# Patient Record
Sex: FEMALE | NOT HISPANIC OR LATINO | URBAN - METROPOLITAN AREA
[De-identification: names, ages, dates, MRNs, and addresses within clinical notes are randomized per-mention and may not be internally consistent; named-entity substitution may affect disease eponyms.]

---

## 2022-06-02 ENCOUNTER — MEDICAL CORRESPONDENCE (OUTPATIENT)
Dept: HEALTH INFORMATION MANAGEMENT | Facility: CLINIC | Age: 67
End: 2022-06-02

## 2022-06-06 ENCOUNTER — TRANSCRIBE ORDERS (OUTPATIENT)
Dept: OTHER | Age: 67
End: 2022-06-06
Payer: COMMERCIAL

## 2022-06-06 DIAGNOSIS — R27.0 ATAXIA: ICD-10-CM

## 2022-06-06 DIAGNOSIS — R29.2 HYPER REFLEXIA: Primary | ICD-10-CM

## 2022-06-09 NOTE — TELEPHONE ENCOUNTER
Action 6/9/22 MV 1.54pm   Action Taken Imaging request faxed to [facility]    6/17/22 MV 2.12pm  Images resolved in PACS         RECORDS RECEIVED FROM: external   REASON FOR VISIT: ataxia   Date of Appt: 7/8/22   NOTES (FOR ALL VISITS) STATUS DETAILS   OFFICE NOTE from referring provider Care Everywhere Dr Hiram Moreno @ [facility]:  6/2/22   OFFICE NOTE from other specialist Care Everywhere Dr Olivera @ [facility]:  5/9/22   MEDICATION LIST Care Everywhere    IMAGING  (FOR ALL VISITS)     MRI (HEAD, NECK, SPINE) Received [facility]:  MRI Brain 5/13/22  MRI Thoracic Spine 4/19/22  MRI Thoracic Spine 4/7/22  MRI Cervical Spine 4/7/22  MRI Lumbar Spine 3/29/22

## 2022-06-12 ENCOUNTER — HEALTH MAINTENANCE LETTER (OUTPATIENT)
Age: 67
End: 2022-06-12

## 2022-07-08 ENCOUNTER — OFFICE VISIT (OUTPATIENT)
Dept: NEUROLOGY | Facility: CLINIC | Age: 67
End: 2022-07-08
Payer: COMMERCIAL

## 2022-07-08 ENCOUNTER — VIRTUAL VISIT (OUTPATIENT)
Dept: NEUROLOGY | Facility: CLINIC | Age: 67
End: 2022-07-08
Payer: COMMERCIAL

## 2022-07-08 ENCOUNTER — PRE VISIT (OUTPATIENT)
Dept: NEUROLOGY | Facility: CLINIC | Age: 67
End: 2022-07-08

## 2022-07-08 VITALS
DIASTOLIC BLOOD PRESSURE: 76 MMHG | WEIGHT: 133.7 LBS | OXYGEN SATURATION: 97 % | TEMPERATURE: 98.2 F | HEART RATE: 80 BPM | SYSTOLIC BLOOD PRESSURE: 114 MMHG

## 2022-07-08 DIAGNOSIS — R27.0 ATAXIA: Primary | ICD-10-CM

## 2022-07-08 DIAGNOSIS — R25.2 SPASTICITY: Primary | ICD-10-CM

## 2022-07-08 PROCEDURE — 99204 OFFICE O/P NEW MOD 45 MIN: CPT | Performed by: PSYCHIATRY & NEUROLOGY

## 2022-07-08 PROCEDURE — 99207 PR NO BILLABLE SERVICE THIS VISIT: CPT | Performed by: GENETIC COUNSELOR, MS

## 2022-07-08 RX ORDER — ZOLPIDEM TARTRATE 5 MG/1
5 TABLET ORAL PRN
COMMUNITY
Start: 2022-01-24

## 2022-07-08 RX ORDER — NIACINAMIDE 500 MG
1000 TABLET ORAL DAILY
COMMUNITY

## 2022-07-08 RX ORDER — ASPIRIN 81 MG/81MG
81 CAPSULE ORAL DAILY
COMMUNITY
Start: 2022-05-24

## 2022-07-08 RX ORDER — FEXOFENADINE HCL 180 MG/1
180 TABLET ORAL DAILY
COMMUNITY

## 2022-07-08 NOTE — PROGRESS NOTES
Department of Neurology  Movement Disorders Division   Ataxia Clinic  New Patient Visit     Patient: Nancy Vizcaino   MRN: [id number]   : 1955   Date of Visit: 2022  PCP: Jovana Martinez   Referring provider: Mookie Moreno MD  Ms. Vizcaino is a 67-year-old woman who was referred to the movement disorders clinic for gait imbalance.  She had multiple evaluation therapy in the past.  She was found to have lumbar sacral stenosis with spondylolisthesis in the lumbar sacral area.  It is unclear if this is unstable and if fusion is needed.  She underwent spinal strengthening exercises.  She was also found to have a lesion at T9 primarily on the left side that was an enhancing mass mass that was thought to be a meningioma or nerve sheath tumor.  She does report some urinary urgency and urge incontinence.  Another issue with Ms. Vizcaino is that her sister was seen in the clinic here about 20 years ago and was apparently suspected for ataxia and was eventually diagnosed with multiple sclerosis.    No past medical history on file.  Current Outpatient Medications   Medication     Aspirin (VAZALORE) 81 MG CAPS     fexofenadine (ALLEGRA) 180 MG tablet     niacinamide 500 MG tablet     zolpidem (AMBIEN) 5 MG tablet     No current facility-administered medications for this visit.     Physical examination Ms. Vizcaino was cognitively intact pleasant woman her speech was normal cranial nerves were unremarkable no nystagmus.  Upper and lower extremity examination showed normal tone in the upper extremities reflexes were normal in the upper extremities and hyper in the lower extremities.  Does have definite hyper Claudia/spasticity in the lower extremities slightly more on the left side than the right.  The strength in the lower extremities was normal including dorsiflexion.  She could walk on heels and toes a little or no difficulty no asymmetry in dorsi flexion or walking on heels.  Reflexes were brisk in the lower  extremities at 3+.  The plantars were equivocal however.  Sensory examination in the lower extremities was normal with a vibratory sensation more than 8 seconds at the big toes.  No distal shading of pinprick sensation.  Abdominal reflexes were absent in 4 quarters.  LABS:  6/7/22:  - Vitamin E alpha 10.4, Gamma 0.7  - TTG antibodies negative    5/18/22:  - paraneoplastic panel negative  - B12 881 (elevated)  - homocysteine 10.1  - folate 14.2  - copper 131.2  - JASKARAN antibody <5.0    IMAGING/STUDIES:  MR brain with/without contrast 5/13/2022:   IMPRESSION:     1. No evidence for acute infarct.   2. No abnormal intra-axial signal or enhancement.   3. Degenerative disc disease at the C4-C5 level of the cervical spine.     MR thoracic spine with/without 4/19/2022:  IMPRESSION:   Previously noted mildly lobulated, extramedullary, intradural focus of T2 hypointensity at the level of T9 demonstrates associated enhancement, series 16, image 17. This finding is indeterminate, but could represent a nerve sheath tumor or meningioma. There is contact with the left lateral spinal cord without appreciable mass effect. Interval follow-up examination is recommended.    MR cervical spine without contrast 4/7/2022:  1. Moderate spinal canal stenosis at C4-5 and C5-6.  2. Moderate to severe bilateral neural foraminal stenosis at C4-5.  3. Additional degenerative changes as above.    MR lumbar spine without contrast for 3/29/2022  IMPRESSION: Compared with 01/10/2019,  1. Progression of degenerative changes at L3-4. There is now severe canal stenosis. Similar moderate to severe bilateral foraminal stenosis.  2. Remainder of additional degenerative changes are overall similar to prior. Severe canal stenosis L4-5. Moderate bilateral foraminal stenosis L5-S1.    EMG/NCS 5/18/2022:  Electrodiagnostic Conclusion:  This is a mildly abnormal study. Pertinent findings:  1. There is no electrodiagnostic evidence to indicate any acute or  subacute lumbosacral radiculopathy affecting either lower extremity.  2. There is no electrodiagnostic evidence to indicate a large fiber polyneuropathy.  3. There is electrodiagnostic evidence to suggest a very mild chronic left L4 radiculopathy without suggestion of recent denervation.    Assessment spasticity of the lower extremities perhaps slightly asymmetric.  Given the lesion in the thoracic spine at the level cord compression does need to be ruled out.  Ms. Vizcaino will be referred to neurosurgery.  She would also like to have the lumbar sacral region looked at with flexion-extension imaging.  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

## 2022-07-08 NOTE — LETTER
7/8/2022       RE: Nancy Vizcaino  [address] [address]     Dear Colleague,    Thank you for referring your patient, Nancy Vizcaino, to the [facility]. Please see a copy of my visit note below.    GENETIC COUNSELING-Ataxia clinic  I met with Nancy initially in the ataxia clinic, but had to schedule a phone consult for later today because I had other patients scheduled and she was not on my schedule for the day. We had a 20 minute phone conversation to review family history and discuss genetic testing options.    MEDICAL HISTORY-  Nancy reports a history of spasticity/stiffness. She was referred by Dr. Mookie Moreno for evaluation. She believes symptoms began 5 years ago and have worsened over the past year.  Her symptoms may be due to a spinal lesion, but given the history in her sister, the possibility of a genetic etiology is considered.    FAMILY HISTORY-see scanned pedigree  1. Nancy's sister was diagnosed with MS many years ago in our clinic. She was reportedly seen in our ataxia clinic, but I cannot view records in our current system. She was seen by our MS specialist, Dr. Abraham, and she was noted to have multiple lesions on MRI consistent with this diagnosis. Nancy spoke with her sister and they were not certain whether spinal tap showed characteristic findings.  2. Nancy reported that her parents are second cousins (her paternal great grandfathers on both sides were brothers)- she did not specify which of her grandparents are the connecting grandparents.    GENETIC COUNSELING-  We discussed the genetic and environmental basis of neurologic disease. I explained that in most cases, it results form complex and lifelong interaction of multiple genetic and environmental factors. In some cases, there may be a single gene cause.  I explained that her symptoms, in the context of her sister's diagnosis,  raises some suspicion for genetic etiology for her symptoms- specifically Hereditary spastic paraplegia (HSP).    I explained that HSP is an extremely genetically heterogeneous condition that can be inherited in many different patterns. The history of consanguinity in her parents along with the history in her sister and the lack of affected relatives in other generations would suggest the possibility of a recessive mode of inheritance.    I explained that genetic testing evaluates several dozen genes associated with HSP. If pathogenic mutation(s) are identified, this would provide a precise diagnosis and would directly impact the risk/benefit discussion of doing spinal surgery to alleviate her symptoms. A positive result would also allow us to provide risk assessments and/or targeted testing to family members. I did explain that the testing also might not idenitfy a precise diagnosis.    I did spend some time discussing her sister's diagnosis.  I explained that in some cases, an incorrect name may be put on a condition- although I am certainly not calling into doubt her sister's diagnosis. If we do identify a genetic etiology in Nancy, it might be worthwhile to consider testing in her sister to see if she has the same diagnosis.    Nancy provided consent for genetic testing for HSP. Testing will be done through [employer]'s patient pay testing program.      Sumit Shin MS, [facility]  Certified Genetic Counselor

## 2022-07-08 NOTE — LETTER
2022     RE: Nancy Vizcaino  [address] [address]     Dear Colleague,    Thank you for referring your patient, Nancy Vizcaino, to the [facility]UR[facility]IC [place] [facility]. Please see a copy of my visit note below.    Department of Neurology  Movement Disorders Division   Ataxia Clinic  New Patient Visit     Patient: Nancy Vizcaino   MRN: [id number]   : 1955   Date of Visit: 2022  PCP: Jovana Martinez   Referring provider: Mookie Moreno MD  Ms. Vizcaino is a 67-year-old woman who was referred to the movement disorders clinic for gait imbalance.  She had multiple evaluation therapy in the past.  She was found to have lumbar sacral stenosis with spondylolisthesis in the lumbar sacral area.  It is unclear if this is unstable and if fusion is needed.  She underwent spinal strengthening exercises.  She was also found to have a lesion at T9 primarily on the left side that was an enhancing mass mass that was thought to be a meningioma or nerve sheath tumor.  She does report some urinary urgency and urge incontinence.  Another issue with Ms. Vizcaino is that her sister was seen in the clinic here about 20 years ago and was apparently suspected for ataxia and was eventually diagnosed with multiple sclerosis.    No past medical history on file.  Current Outpatient Medications   Medication     Aspirin (VAZALORE) 81 MG CAPS     fexofenadine (ALLEGRA) 180 MG tablet     niacinamide 500 MG tablet     zolpidem (AMBIEN) 5 MG tablet     No current facility-administered medications for this visit.     Physical examination Ms. Vizcaino was cognitively intact pleasant woman her speech was normal cranial nerves were unremarkable no nystagmus.  Upper and lower extremity examination showed normal tone in the upper extremities reflexes were normal in the upper extremities and hyper in the lower extremities.  Does have definite hyper  Claudia/spasticity in the lower extremities slightly more on the left side than the right.  The strength in the lower extremities was normal including dorsiflexion.  She could walk on heels and toes a little or no difficulty no asymmetry in dorsi flexion or walking on heels.  Reflexes were brisk in the lower extremities at 3+.  The plantars were equivocal however.  Sensory examination in the lower extremities was normal with a vibratory sensation more than 8 seconds at the big toes.  No distal shading of pinprick sensation.  Abdominal reflexes were absent in 4 quarters.  LABS:  6/7/22:  - Vitamin E alpha 10.4, Gamma 0.7  - TTG antibodies negative    5/18/22:  - paraneoplastic panel negative  - B12 881 (elevated)  - homocysteine 10.1  - folate 14.2  - copper 131.2  - JASKARAN antibody <5.0    IMAGING/STUDIES:  MR brain with/without contrast 5/13/2022:   IMPRESSION:     1. No evidence for acute infarct.   2. No abnormal intra-axial signal or enhancement.   3. Degenerative disc disease at the C4-C5 level of the cervical spine.     MR thoracic spine with/without 4/19/2022:  IMPRESSION:   Previously noted mildly lobulated, extramedullary, intradural focus of T2 hypointensity at the level of T9 demonstrates associated enhancement, series 16, image 17. This finding is indeterminate, but could represent a nerve sheath tumor or meningioma. There is contact with the left lateral spinal cord without appreciable mass effect. Interval follow-up examination is recommended.    MR cervical spine without contrast 4/7/2022:  1. Moderate spinal canal stenosis at C4-5 and C5-6.  2. Moderate to severe bilateral neural foraminal stenosis at C4-5.  3. Additional degenerative changes as above.    MR lumbar spine without contrast for 3/29/2022  IMPRESSION: Compared with 01/10/2019,  1. Progression of degenerative changes at L3-4. There is now severe canal stenosis. Similar moderate to severe bilateral foraminal stenosis.  2. Remainder of  additional degenerative changes are overall similar to prior. Severe canal stenosis L4-5. Moderate bilateral foraminal stenosis L5-S1.    EMG/NCS 5/18/2022:  Electrodiagnostic Conclusion:  This is a mildly abnormal study. Pertinent findings:  1. There is no electrodiagnostic evidence to indicate any acute or subacute lumbosacral radiculopathy affecting either lower extremity.  2. There is no electrodiagnostic evidence to indicate a large fiber polyneuropathy.  3. There is electrodiagnostic evidence to suggest a very mild chronic left L4 radiculopathy without suggestion of recent denervation.    Assessment spasticity of the lower extremities perhaps slightly asymmetric.  Given the lesion in the thoracic spine at the level cord compression does need to be ruled out.  Ms. Vizcaino will be referred to neurosurgery.  She would also like to have the lumbar sacral region looked at with flexion-extension imaging.  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    Again, thank you for allowing me to participate in the care of your patient.      Sincerely,    Razia Alvarado MD

## 2022-07-08 NOTE — PROGRESS NOTES
Department of Neurology  Movement Disorders Division   Ataxia Clinic  New Patient Visit    Patient: Nancy Vizcaino   MRN: [id number]   : 1955   Date of Visit: 2022  PCP: Jovana Martinez   Referring provider: Mookie Moreno MD    CC:  Gait instability    HPI:  Ms. Vizcaino is a 67 year old female with history significant for lumbar and cervical spinal stenosis as well as possible thoracic meningioma versus nerve sheath tumor who presents to ataxia clinic for gait instability. She is accompanied by ***.    She reports ***    MEDICATIONS reviewed & pertinent for:  ***    ROS:  All others negative except as listed above.    No past medical history on file.     No past surgical history on file.     No current outpatient medications on file.     Not on File     No family history on file.     Social History:  She       PHYSICAL EXAM:  There were no vitals taken for this visit. ***    Gen: alert, active, attentive, appropriately groomed     NEURO:***  MS: Alert and oriented to person, place, time, and situation.  Speech normal to comprehension and naming.  Recent and remote memory intact.  Attention and concentration normal.  Fund of knowledge normal.    CN:  Pupils equal, round, and reactive to light. VFF. EOM normal range, no nystagmus.  Normal saccades. Facial sensation intact. Face symmetric at rest and with activation. Intact to finger rub bilaterally. Palate rise b/l, uvula midline.  No dysarthria. Trapezius and SCM 5/5 bilaterally. Tongue protrudes midline. No fasciculation or atrophy noted.    Motor:  Normal bulk and tone throughout upper and lower extremities.  No abnormal movements or fasciculations observed. Strength is 5/5 throughout and symmetric. ***    Sensation:  Intact to LT, vibration, and temperature in all extremities.    Coordination:  FTN and HTN intact bilaterally.    Gait:  Normal gait. Tandem gait intact.  Able to walk on toes and heels.  Romberg negative.    LABS:  22:  - Vitamin E  alpha 10.4, Gamma 0.7  - TTG antibodies negative    5/18/22:  - paraneoplastic panel negative  - B12 881 (elevated)  - homocysteine 10.1  - folate 14.2  - copper 131.2  - JASKARAN antibody <5.0    IMAGING/STUDIES:  MR brain with/without contrast 5/13/2022:   IMPRESSION:     1. No evidence for acute infarct.   2. No abnormal intra-axial signal or enhancement.   3. Degenerative disc disease at the C4-C5 level of the cervical spine.     MR thoracic spine with/without 4/19/2022:  IMPRESSION:   Previously noted mildly lobulated, extramedullary, intradural focus of T2 hypointensity at the level of T9 demonstrates associated enhancement, series 16, image 17. This finding is indeterminate, but could represent a nerve sheath tumor or meningioma. There is contact with the left lateral spinal cord without appreciable mass effect. Interval follow-up examination is recommended.    MR cervical spine without contrast 4/7/2022:  1. Moderate spinal canal stenosis at C4-5 and C5-6.  2. Moderate to severe bilateral neural foraminal stenosis at C4-5.  3. Additional degenerative changes as above.    MR lumbar spine without contrast for 3/29/2022  IMPRESSION: Compared with 01/10/2019,  1. Progression of degenerative changes at L3-4. There is now severe canal stenosis. Similar moderate to severe bilateral foraminal stenosis.  2. Remainder of additional degenerative changes are overall similar to prior. Severe canal stenosis L4-5. Moderate bilateral foraminal stenosis L5-S1.    EMG/NCS 5/18/2022:  Electrodiagnostic Conclusion:  This is a mildly abnormal study. Pertinent findings:  1. There is no electrodiagnostic evidence to indicate any acute or subacute lumbosacral radiculopathy affecting either lower extremity.  2. There is no electrodiagnostic evidence to indicate a large fiber polyneuropathy.  3. There is electrodiagnostic evidence to suggest a very mild chronic left L4 radiculopathy without suggestion of recent  denervation.    ASSESSMENT:  ***    PLAN:  ***    Patient and plan was examined & discussed with attending physician, Dr. Alvarado.     Emily Wellington MD  Movement Disorders Fellow

## 2022-07-08 NOTE — PROGRESS NOTES
Nancy is a 67 year old who is being evaluated via a billable telephone visit.      What phone number would you like to be contacted at? cell  How would you like to obtain your AVS? CANDY    Phone call duration: 20 minutes      GENETIC COUNSELING-Ataxia clinic  I met with Nancy initially in the ataxia clinic, but had to schedule a phone consult for later today because I had other patients scheduled and she was not on my schedule for the day. We had a 20 minute phone conversation to review family history and discuss genetic testing options.    MEDICAL HISTORY-  Nancy reports a history of spasticity/stiffness. She was referred by Dr. Mookie Moreno for evaluation. She believes symptoms began 5 years ago and have worsened over the past year.  Her symptoms may be due to a spinal lesion, but given the history in her sister, the possibility of a genetic etiology is considered.    FAMILY HISTORY-see scanned pedigree  1. Nancy's sister was diagnosed with MS many years ago in our clinic. She was reportedly seen in our ataxia clinic, but I cannot view records in our current system. She was seen by our MS specialist, Dr. Abraham, and she was noted to have multiple lesions on MRI consistent with this diagnosis. Nancy spoke with her sister and they were not certain whether spinal tap showed characteristic findings.  2. Nancy reported that her parents are second cousins (her paternal great grandfathers on both sides were brothers)- she did not specify which of her grandparents are the connecting grandparents.    GENETIC COUNSELING-  We discussed the genetic and environmental basis of neurologic disease. I explained that in most cases, it results form complex and lifelong interaction of multiple genetic and environmental factors. In some cases, there may be a single gene cause.  I explained that her symptoms, in the context of her sister's diagnosis, raises some suspicion for genetic etiology for her symptoms- specifically  Hereditary spastic paraplegia (HSP).    I explained that HSP is an extremely genetically heterogeneous condition that can be inherited in many different patterns. The history of consanguinity in her parents along with the history in her sister and the lack of affected relatives in other generations would suggest the possibility of a recessive mode of inheritance.    I explained that genetic testing evaluates several dozen genes associated with HSP. If pathogenic mutation(s) are identified, this would provide a precise diagnosis and would directly impact the risk/benefit discussion of doing spinal surgery to alleviate her symptoms. A positive result would also allow us to provide risk assessments and/or targeted testing to family members. I did explain that the testing also might not idenitfy a precise diagnosis.    I did spend some time discussing her sister's diagnosis.  I explained that in some cases, an incorrect name may be put on a condition- although I am certainly not calling into doubt her sister's diagnosis. If we do identify a genetic etiology in Nancy, it might be worthwhile to consider testing in her sister to see if she has the same diagnosis.    Nancy provided consent for genetic testing for HSP. Testing will be done through [employer]'s patient pay testing program.    Sumit Shin MS, [facility]  Certified Genetic Counselor

## 2022-07-08 NOTE — NURSING NOTE
Pt is here for an ataxia evaluation.  Pt denies headaches. She does not have double or blurred vision. She does not choke when she eats or drink.She states she falls about 2 times per month. She is albino to get her self up. She only has problems sleeping when she is worried, sometimes she takes Ambien. She does snore when she sleeps. She is not tired during the day. She has been going to physical therapy twice per week.

## 2022-07-18 ENCOUNTER — TELEPHONE (OUTPATIENT)
Dept: NEUROSURGERY | Facility: CLINIC | Age: 67
End: 2022-07-18

## 2022-07-19 ENCOUNTER — TELEPHONE (OUTPATIENT)
Dept: NEUROSURGERY | Facility: CLINIC | Age: 67
End: 2022-07-19

## 2022-07-19 DIAGNOSIS — M48.061 SPINAL STENOSIS, LUMBAR REGION, WITHOUT NEUROGENIC CLAUDICATION: Primary | ICD-10-CM

## 2022-07-19 DIAGNOSIS — M54.16 LUMBAR RADICULOPATHY: ICD-10-CM

## 2022-07-19 NOTE — TELEPHONE ENCOUNTER
[facility] Call Center    Phone Message    May a detailed message be left on voicemail: yes     Reason for Call: Appointment Intake    Referring Provider Name: Razia Alvarado MD  Diagnosis and/or Symptoms: Ataxia     Please call Pt back at [phone] to schedule    Action Taken: Message routed to:  [facility] Neurosurgery    Travel Screening: Not Applicable

## 2022-07-20 NOTE — TELEPHONE ENCOUNTER
SPINE PATIENTS - NEW PROTOCOL PREVISIT    RECORDS RECEIVED FROM: internal   REASON FOR VISIT: Spinal chord tumor   Date of Appt: 8/23/22   NOTES (FOR ALL VISITS) STATUS DETAILS   OFFICE NOTE from referring provider Internal Dr Alvarado @ [facility]:  7/8/22   EMG REPORT Care Everywhere [facility]:  5/18/22   MEDICATION LIST Internal    IMAGING  (FOR ALL VISITS)     MRI (HEAD, NECK, SPINE) Received [facility]:  MRI Thoracic Spine 4/19/22  MRI Thoracic Spine 4/7/22  MRI Cervical Spine 4/7/22  MRI Lumbar Spine 3/29/22

## 2022-07-29 ENCOUNTER — TELEPHONE (OUTPATIENT)
Dept: NEUROLOGY | Facility: CLINIC | Age: 67
End: 2022-07-29

## 2022-07-29 NOTE — TELEPHONE ENCOUNTER
Called pt, she asked to have her records faxed to the [facility], fax [phone].  She has an appointment on Aug 19 to see Dr. Alvarado and Sumit Shin, genetics counselor.  Clinic notes sent

## 2022-08-05 ENCOUNTER — TELEPHONE (OUTPATIENT)
Dept: NEUROSURGERY | Facility: CLINIC | Age: 67
End: 2022-08-05

## 2022-08-18 ENCOUNTER — TELEPHONE (OUTPATIENT)
Dept: NEUROSURGERY | Facility: CLINIC | Age: 67
End: 2022-08-18

## 2022-08-18 NOTE — TELEPHONE ENCOUNTER
[facility]    Phone Message    May a detailed message be left on voicemail: yes     Reason for Call: Other: Pt called and stated that she was told that she needed to reschedule her appt on 08/23/22. Per notes appt was to be switched to the afternoon. Writer was unable to schedule for 08/23 as there were no later appts available. Pt did request the appt on 08/30 as she wanted to make sure that she had an appt scheduled in the meantime if something were to come up. Please call Pt back to discuss.      Action Taken: Message routed to:  [facility]    Travel Screening: Not Applicable

## 2022-08-19 ENCOUNTER — OFFICE VISIT (OUTPATIENT)
Dept: NEUROLOGY | Facility: CLINIC | Age: 67
End: 2022-08-19
Payer: COMMERCIAL

## 2022-08-19 VITALS
HEART RATE: 71 BPM | DIASTOLIC BLOOD PRESSURE: 83 MMHG | SYSTOLIC BLOOD PRESSURE: 122 MMHG | WEIGHT: 135.6 LBS | OXYGEN SATURATION: 96 %

## 2022-08-19 DIAGNOSIS — Z15.89: Primary | ICD-10-CM

## 2022-08-19 DIAGNOSIS — G11.4 HEREDITARY SPASTIC PARAPLEGIA (H): Primary | ICD-10-CM

## 2022-08-19 PROCEDURE — 99207 PR STATISTIC GENETIC COUNSELING, <16 MIN: CPT | Performed by: GENETIC COUNSELOR, MS

## 2022-08-19 PROCEDURE — 99213 OFFICE O/P EST LOW 20 MIN: CPT | Mod: 25 | Performed by: PSYCHIATRY & NEUROLOGY

## 2022-08-19 RX ORDER — BACLOFEN 10 MG/1
5 TABLET ORAL 3 TIMES DAILY
Qty: 90 TABLET | Refills: 3 | Status: SHIPPED | OUTPATIENT
Start: 2022-08-19

## 2022-08-19 ASSESSMENT — PAIN SCALES - GENERAL: PAINLEVEL: NO PAIN (0)

## 2022-08-19 NOTE — PROGRESS NOTES
GENETIC COUNSELING-Ataxia clinic  I met with Nancy Vizcaino and her , Andrew, for a 25 minute genetic consult in the ataxia clinic. I had met with her previously to discuss genetic testing and to review family history.  Since that visit, we received results of genetic testing that confirmed a diagnosis of hereditary spastic paraplegia type 7 (SPG7).  She is homozygous for a pathogenic [id number] variant in SPG7- which is consistent with the reported consanguinity in her parents.    We updated family history today with the following information:  1. Nancy shared her diagnosis with her sister. Her sister has been diagnosed with multiple sclerosis and we had been considering the possibility of whether she might also have this genetic condition. It is certainly possible that she actually has SPG7- but from her conversation with her sister, it did not sound like she wanted to pursue any testing at this time. Nancy has made her sister's children aware of the genetic diagnosis in the family.  2. Nancy clarified the exact relationship between her parents. Her mother's father's father was brothers with her father's father's father.    We discussed the autosomal recessive inheritance of SPG7.  Nancy's result confirmed that she has inherited the same pathogenic variant from each of her parents. Her siblings could theoretically be affected, carriers, or unaffected non-carriers. She has shared information about her diagnosis with her siblings and their children.    We discussed how mutations in SPG7 can cause a spectrum of neurologic findings that can include both ataxia and spasticity. Some individuals may only manifest symptoms of spasticity, while others have both ataxia and spasticity. I explained that this is a progressive condition and we would expect her symptoms to grow worse over time- although it is hard to make precise predictions about the future in any given patient. We discussed the importance of  "remaining healthy and physically active. We discussed support resources including the National Ataxia [employer]. I did explain that even though this condition is formally considered \"hereditary spastic paraplegia\" SPG7 is actually a quite common diagnosis in our ataxia clinic and therefore there are many patients diagnosed with SPG7 who are active in the NAF.    All questions were answered.    Sumit Shin MS, [facility]  Certified Genetic Counselor  "

## 2022-08-19 NOTE — LETTER
8/19/2022       RE: Nancy Vizcaino  [address]  [place] [address]     Dear Colleague,    Thank you for referring your patient, Nancy Vizcaino, to the [facility] [place] [facility]. Please see a copy of my visit note below.    I have personally interviewed and examined Ms. Nancy Vizcaino and agree with diagnosis and management. The total time spent with the patient was 30 minutes, over 50% of the time spent in counseling and coordinating care.    Ms. Vizcaino returns for follow-up today she is 67-year-old woman with spastic paraparesis.  She does have a lesion on the left side at T9 with cord compression however these findings are bilateral and she was checked for hereditary spastic paraplegia and turned out to have SPG 7.    No past medical history on file.  Current Outpatient Medications   Medication     baclofen (LIORESAL) 10 MG tablet     Aspirin (VAZALORE) 81 MG CAPS     fexofenadine (ALLEGRA) 180 MG tablet     niacinamide 500 MG tablet     zolpidem (AMBIEN) 5 MG tablet     No current facility-administered medications for this visit.     Physical examination: This was unchanged she does have hyperreflexia of the lower extremities left more than right gait was slightly wide-based and spastic.    Assessment hereditary spastic paraplegia type VII.  We discussed using baclofen as well as medical marijuana she is agreeable to try both.  She will meet with neurosurgery next week and urology a few weeks later.    Razia Alvarado MD

## 2022-08-19 NOTE — LETTER
8/19/2022       RE: Nancy Vizcaino  [address]  [place] [address]     Dear Colleague,    Thank you for referring your patient, Nancy Vizcaino, to the [facility]. Please see a copy of my visit note below.    GENETIC COUNSELING-Ataxia clinic  I met with Nancy Vizcaino and her , Andrew, for a 25 minute genetic consult in the ataxia clinic. I had met with her previously to discuss genetic testing and to review family history.  Since that visit, we received results of genetic testing that confirmed a diagnosis of hereditary spastic paraplegia type 7 (SPG7).  She is homozygous for a pathogenic [id number] variant in SPG7- which is consistent with the reported consanguinity in her parents.    We updated family history today with the following information:  1. Nancy shared her diagnosis with her sister. Her sister has been diagnosed with multiple sclerosis and we had been considering the possibility of whether she might also have this genetic condition. It is certainly possible that she actually has SPG7- but from her conversation with her sister, it did not sound like she wanted to pursue any testing at this time. Nancy has made her sister's children aware of the genetic diagnosis in the family.  2. Nancy clarified the exact relationship between her parents. Her mother's father's father was brothers with her father's father's father.    We discussed the autosomal recessive inheritance of SPG7.  Nancy's result confirmed that she has inherited the same pathogenic variant from each of her parents. Her siblings could theoretically be affected, carriers, or unaffected non-carriers. She has shared information about her diagnosis with her siblings and their children.    We discussed how mutations in SPG7 can cause a spectrum of neurologic findings that can include both ataxia and spasticity. Some individuals may only  "manifest symptoms of spasticity, while others have both ataxia and spasticity. I explained that this is a progressive condition and we would expect her symptoms to grow worse over time- although it is hard to make precise predictions about the future in any given patient. We discussed the importance of remaining healthy and physically active. We discussed support resources including the National Ataxia Foundation. I did explain that even though this condition is formally considered \"hereditary spastic paraplegia\" SPG7 is actually a quite common diagnosis in our ataxia clinic and therefore there are many patients diagnosed with SPG7 who are active in the NAF.    All questions were answered.    Sumit Shin MS, [facility]  Certified Genetic Counselor  "

## 2022-08-19 NOTE — PROGRESS NOTES
Ms. Vizcaino returns for follow-up today she is 67-year-old woman with spastic paraparesis.  She does have a lesion on the left side at T9 with cord compression however these findings are bilateral and she was checked for hereditary spastic paraplegia and turned out to have SPG 7.    No past medical history on file.  Current Outpatient Medications   Medication     baclofen (LIORESAL) 10 MG tablet     Aspirin (VAZALORE) 81 MG CAPS     fexofenadine (ALLEGRA) 180 MG tablet     niacinamide 500 MG tablet     zolpidem (AMBIEN) 5 MG tablet     No current facility-administered medications for this visit.     Physical examination: This was unchanged she does have hyperreflexia of the lower extremities left more than right gait was slightly wide-based and spastic.    Assessment hereditary spastic paraplegia type VII.  We discussed using baclofen as well as medical marijuana she is agreeable to try both.  She will meet with neurosurgery next week and urology a few weeks later.

## 2022-08-19 NOTE — PROGRESS NOTES
I have personally interviewed and examined Ms. Nancy Vizcaino and agree with diagnosis and management. The total time spent with the patient was 30 minutes, over 50% of the time spent in counseling and coordinating care.

## 2022-08-23 ENCOUNTER — PRE VISIT (OUTPATIENT)
Dept: NEUROSURGERY | Facility: CLINIC | Age: 67
End: 2022-08-23

## 2022-08-30 ENCOUNTER — ANCILLARY PROCEDURE (OUTPATIENT)
Dept: GENERAL RADIOLOGY | Facility: CLINIC | Age: 67
End: 2022-08-30
Attending: NEUROLOGICAL SURGERY
Payer: COMMERCIAL

## 2022-08-30 ENCOUNTER — OFFICE VISIT (OUTPATIENT)
Dept: NEUROSURGERY | Facility: CLINIC | Age: 67
End: 2022-08-30
Attending: PSYCHIATRY & NEUROLOGY
Payer: COMMERCIAL

## 2022-08-30 VITALS — HEART RATE: 71 BPM | DIASTOLIC BLOOD PRESSURE: 73 MMHG | SYSTOLIC BLOOD PRESSURE: 108 MMHG | OXYGEN SATURATION: 97 %

## 2022-08-30 DIAGNOSIS — R27.0 ATAXIA: ICD-10-CM

## 2022-08-30 DIAGNOSIS — M48.061 SPINAL STENOSIS, LUMBAR REGION, WITHOUT NEUROGENIC CLAUDICATION: ICD-10-CM

## 2022-08-30 DIAGNOSIS — M54.16 LUMBAR RADICULOPATHY: ICD-10-CM

## 2022-08-30 PROCEDURE — 72082 X-RAY EXAM ENTIRE SPI 2/3 VW: CPT | Performed by: STUDENT IN AN ORGANIZED HEALTH CARE EDUCATION/TRAINING PROGRAM

## 2022-08-30 PROCEDURE — 77073 BONE LENGTH STUDIES: CPT | Performed by: STUDENT IN AN ORGANIZED HEALTH CARE EDUCATION/TRAINING PROGRAM

## 2022-08-30 PROCEDURE — 99204 OFFICE O/P NEW MOD 45 MIN: CPT | Mod: GC | Performed by: NEUROLOGICAL SURGERY

## 2022-08-30 RX ORDER — FLUTICASONE PROPIONATE 50 MCG
2 SPRAY, SUSPENSION (ML) NASAL PRN
COMMUNITY

## 2022-08-30 ASSESSMENT — PAIN SCALES - GENERAL: PAINLEVEL: MILD PAIN (2)

## 2022-08-30 NOTE — PATIENT INSTRUCTIONS
Dr Peterson talked about the possibility of surgery and you have opted to wait  and observe for the time being.  Please return to clinic for f/u in 3 months with updated imaging.

## 2022-08-30 NOTE — PROGRESS NOTES
[facility]       H&P NOTE    HPI:  Ms. Vizcaino is a 67-year-old woman with gait imbalance since 5 years, worse in the last one year. She was recently diagnosed with hereditary spastic paraplegia type VII and is on baclofen. She also has low back pain and has lumbar sacral stenosis with spondylolisthesis for years. During the work up for the gait imbalance, she was found to have enhancing mass at T9 primarily on the left side and was thought to be a meningioma or nerve sheath tumor. She was referred to Neurosurgery for the evaluation of this mass.    She denies thoracic back pain or weakness or numbness. Her low back pain, unchanged over the last few years. MRI demonstrates L3-L4, L4-L5 spinal canal stenosis with L4-L5 spondylolisthesis.She does report occasional urinary incontinence. She is very functional, exercises regularly No recent fevers, chills, nausea, vomiting, chest pain, shortness of breath, and denies headaches, weakness, LOC, numbness/weakness/paresthesias in extremities, changes in sensation, taste, smell, nor trouble speaking or other neurologic symptoms. Of note, she does have cervical stenosis at C4-5 and C5-6 levels.    PAST MEDICAL HISTORY: No past medical history on file.    PAST SURGICAL HISTORY: No past surgical history on file.    FAMILY HISTORY: No family history on file.    SOCIAL HISTORY:   Social History     Tobacco Use     Smoking status: Never Smoker     Smokeless tobacco: Never Used   Substance Use Topics     Alcohol use: Not on file       MEDICATIONS:  Current Outpatient Medications   Medication Sig Dispense Refill     Aspirin (VAZALORE) 81 MG CAPS Take 81 mg by mouth daily       baclofen (LIORESAL) 10 MG tablet Take 0.5 tablets (5 mg) by mouth 3 times daily (Patient taking differently: Take 10 mg by mouth 2 times daily) 90 tablet 3     fexofenadine (ALLEGRA) 180 MG tablet Take 180 mg by mouth daily       fluticasone (FLONASE) 50 MCG/ACT  nasal spray Spray 2 sprays into both nostrils as needed       niacinamide 500 MG tablet Take 1,000 mg by mouth daily       zolpidem (AMBIEN) 5 MG tablet Take 5 mg by mouth as needed         Allergies:  Allergies   Allergen Reactions     Mixed Ragweed Itching       ROS: 10 point ROS of systems including Constitutional, Eyes, Respiratory, Cardiovascular, Gastroenterology, Genitourinary, Integumentary, Muscularskeletal, Psychiatric were all negative except for pertinent positives noted in my HPI.    Physical exam:   There were no vitals taken for this visit.  General: awake and alert  PULM: breathing comfortably on room air  NEUROLOGIC:  -- Awake; Alert; oriented x 3  -- Follows commands briskly    Motor:  Normal bulk / tone; no tremor, rigidity, or bradykinesia.  No muscle wasting or fasciculations  No Pronator Drift     Delt Bi Tri Hand Flexion/  Extension Iliopsoas Quadriceps Hamstrings Tibialis Anterior Gastroc    C5 C6 C7 C8/T1 L2 L3 L4-S1 L4 S1   R 5 5 5 5 5 5 5 5 5   L 5 5 5 5 5 5 5 5 5     Sensory:  intact to LT x 4 extremities     Reflexes: no clonus       Bi Tri BR [place] Pat Ach Bab     C5-6 C7-8 C6 UMN L2-4 S1 UMN   R 2+ 2+ 2+ Norm 3+ 2+ Norm   L 2+ 2+ 2+ Norm 3+ 2+ Norm        IMAGING:  Recent Results (from the past 24 hour(s))   XR EOS Total Body    Narrative    Exam: Full spine radiographs using EOS     History: Lumbar stenosis/radiculopathy; Initial spine assessment;  Spinal stenosis, lumbar region, without neurogenic claudication;  Lumbar radiculopathy    Techniques: AP and lateral views of spine were submitted for  interpretation.    Comparison: MR thoracic and cervical spine 4/7/2022, MRI lumbar spine  3/29/2022    Findings:    12 rib bearing vertebral bodies and 5 lumbar type vertebral bodies are  identified. Grade 1 anterolisthesis of L3 on L4. Grade 1/2  anterolisthesis of L4 on L5. Moderate disc space loss at L5-S1.  Prominent lower lumbar facet arthropathy.    Coronal Deformity:    There is a no  substantial curvature of the thoracolumbar spine.    No substantial global coronal imbalance.    Sagittal Vertical Axis (A vertical line drawn from the center of C7  (kisha line) to the posterosuperior aspect of the S1 on sagittal  plane):  less than 4 cm       Weight bearing axis: (Defined as a line drawn from the center of the  femoral head to the mid aspect of the tibial plafond).        Right: Weight bearing axis crosses between tibial spines.       Left: Weight bearing axis crosses between tibial spines.      Leg length:  (Measured from the top of the femoral head to the center  of tibial plafond.  It is assumed joints are in similar degrees of  extension bilaterally.  Significant difference is defined when  discrepancy is greater than 1.5 cm).          No significant  leg length discrepancy.      Additional findings: Lungs are clear. Cardiac silhouette is normal.  Nonobstructive bowel gas pattern. Calcifications within the pelvis,  possibly calcified fibroid. Mild degenerative changes of bilateral hip  joints.      Impression    Impression:    1. Moderate degenerative changes of the lumbar spine most prominently  at L3-L4 and L4-L5 with grade 1/2 anterolisthesis.  2. No sagittal or coronal imbalance.  3. Calcifications noted within the pelvis. May be calcified fibroids  versus vascular in nature.    MICH RANDALL MD         SYSTEM ID:  [id number]     MR brain with/without contrast 5/13/2022:   IMPRESSION:     1. No evidence for acute infarct.   2. No abnormal intra-axial signal or enhancement.   3. Degenerative disc disease at the C4-C5 level of the cervical spine.     MR thoracic spine with/without 4/19/2022:  IMPRESSION:   Previously noted mildly lobulated, extramedullary, intradural focus of T2 hypointensity at the level of T9 demonstrates associated enhancement, series 16, image 17. This finding is indeterminate, but could represent a nerve sheath tumor or meningioma. There is contact with the left  lateral spinal cord without appreciable mass effect. Interval follow-up examination is recommended.    MR cervical spine without contrast 4/7/2022:  1. Moderate spinal canal stenosis at C4-5 and C5-6.  2. Moderate to severe bilateral neural foraminal stenosis at C4-5.  3. Additional degenerative changes as above.    MR lumbar spine without contrast for 3/29/2022  IMPRESSION: Compared with 01/10/2019,  1. Progression of degenerative changes at L3-4. There is now severe canal stenosis. Similar moderate to severe bilateral foraminal stenosis.  2. Remainder of additional degenerative changes are overall similar to prior. Severe canal stenosis L4-5. Moderate bilateral foraminal stenosis L5-S1.     EMG/NCS 5/18/2022:  Electrodiagnostic Conclusion:  This is a mildly abnormal study. Pertinent findings:  1. There is no electrodiagnostic evidence to indicate any acute or subacute lumbosacral radiculopathy affecting either lower extremity.  2. There is no electrodiagnostic evidence to indicate a large fiber polyneuropathy.  3. There is electrodiagnostic evidence to suggest a very mild chronic left L4 radiculopathy without suggestion of recent denervation.    PLAN:  Ms. Vizcaino is a 67-year-old woman with hereditary spastic paraplegia type VII, on baclofen; low back pain with lumbar L3-L4, L4-L5 spinal canal stenosis with L4-L5 spondylolisthesis presenting with incidentally discovered enhancing mass at T9 primarily on the left side.     We discussed extensively, that the mass might be a extruded disc or meningioma or nerve sheath tumor. The edges of the mass were not smooth favoring a disc. Given the asymptomatic nature of the lesion, we will follow the patient in November with a repeat Thoracic MRI with and without contrast.     We also reviewed her lumbar spinal stenosis. Her back pain is tolerable, and she is functional with good exercise tolerance. We discussed the etiology, the natural course of progression, and the  possible management options if her pain becomes intolerable. She expressed understanding of the symptoms to watch for. We will see her in the clinic in 3 months after her MRI. She was happy with the plan.    The patient was seen by Dr. Peterson, who agrees with the plan    Jay Montero MD  Neurosurgery Resident  Pager- [phone]    I have seen this patient with the resident and formulated a plan and agree with this note.  AMP

## 2022-08-30 NOTE — LETTER
8/30/2022       RE: Nancy Vizcaino  [address] [place] [address] [address] [address]     Dear Colleague,    Thank you for referring your patient, Nancy Vizcaino, to the [facility]UROS[facility] [place] at [facility]. Please see a copy of my visit note below.    [facility], [place]       H&P NOTE    HPI:  Ms. Vizcaino is a 67-year-old woman with gait imbalance since 5 years, worse in the last one year. She was recently diagnosed with hereditary spastic paraplegia type VII and is on baclofen. She also has low back pain and has lumbar sacral stenosis with spondylolisthesis for years. During the work up for the gait imbalance, she was found to have enhancing mass at T9 primarily on the left side and was thought to be a meningioma or nerve sheath tumor. She was referred to Neurosurgery for the evaluation of this mass.    She denies thoracic back pain or weakness or numbness. Her low back pain, unchanged over the last few years. MRI demonstrates L3-L4, L4-L5 spinal canal stenosis with L4-L5 spondylolisthesis.She does report occasional urinary incontinence. She is very functional, exercises regularly No recent fevers, chills, nausea, vomiting, chest pain, shortness of breath, and denies headaches, weakness, LOC, numbness/weakness/paresthesias in extremities, changes in sensation, taste, smell, nor trouble speaking or other neurologic symptoms. Of note, she does have cervical stenosis at C4-5 and C5-6 levels.    PAST MEDICAL HISTORY: No past medical history on file.    PAST SURGICAL HISTORY: No past surgical history on file.    FAMILY HISTORY: No family history on file.    SOCIAL HISTORY:   Social History     Tobacco Use     Smoking status: Never Smoker     Smokeless tobacco: Never Used   Substance Use Topics     Alcohol use: Not on file       MEDICATIONS:  Current Outpatient Medications   Medication Sig Dispense Refill     Aspirin  (VAZALORE) 81 MG CAPS Take 81 mg by mouth daily       baclofen (LIORESAL) 10 MG tablet Take 0.5 tablets (5 mg) by mouth 3 times daily (Patient taking differently: Take 10 mg by mouth 2 times daily) 90 tablet 3     fexofenadine (ALLEGRA) 180 MG tablet Take 180 mg by mouth daily       fluticasone (FLONASE) 50 MCG/ACT nasal spray Spray 2 sprays into both nostrils as needed       niacinamide 500 MG tablet Take 1,000 mg by mouth daily       zolpidem (AMBIEN) 5 MG tablet Take 5 mg by mouth as needed         Allergies:  Allergies   Allergen Reactions     Mixed Ragweed Itching       ROS: 10 point ROS of systems including Constitutional, Eyes, Respiratory, Cardiovascular, Gastroenterology, Genitourinary, Integumentary, Muscularskeletal, Psychiatric were all negative except for pertinent positives noted in my HPI.    Physical exam:   There were no vitals taken for this visit.  General: awake and alert  PULM: breathing comfortably on room air  NEUROLOGIC:  -- Awake; Alert; oriented x 3  -- Follows commands briskly    Motor:  Normal bulk / tone; no tremor, rigidity, or bradykinesia.  No muscle wasting or fasciculations  No Pronator Drift     Delt Bi Tri Hand Flexion/  Extension Iliopsoas Quadriceps Hamstrings Tibialis Anterior Gastroc    C5 C6 C7 C8/T1 L2 L3 L4-S1 L4 S1   R 5 5 5 5 5 5 5 5 5   L 5 5 5 5 5 5 5 5 5     Sensory:  intact to LT x 4 extremities     Reflexes: no clonus       Bi Tri BR [place] Pat Ach Bab     C5-6 C7-8 C6 UMN L2-4 S1 UMN   R 2+ 2+ 2+ Norm 3+ 2+ Norm   L 2+ 2+ 2+ Norm 3+ 2+ Norm        IMAGING:  Recent Results (from the past 24 hour(s))   XR EOS Total Body    Narrative    Exam: Full spine radiographs using EOS     History: Lumbar stenosis/radiculopathy; Initial spine assessment;  Spinal stenosis, lumbar region, without neurogenic claudication;  Lumbar radiculopathy    Techniques: AP and lateral views of spine were submitted for  interpretation.    Comparison: MR thoracic and cervical spine 4/7/2022, MRI  lumbar spine  3/29/2022    Findings:    12 rib bearing vertebral bodies and 5 lumbar type vertebral bodies are  identified. Grade 1 anterolisthesis of L3 on L4. Grade 1/2  anterolisthesis of L4 on L5. Moderate disc space loss at L5-S1.  Prominent lower lumbar facet arthropathy.    Coronal Deformity:    There is a no substantial curvature of the thoracolumbar spine.    No substantial global coronal imbalance.    Sagittal Vertical Axis (A vertical line drawn from the center of C7  (kisha line) to the posterosuperior aspect of the S1 on sagittal  plane):  less than 4 cm       Weight bearing axis: (Defined as a line drawn from the center of the  femoral head to the mid aspect of the tibial plafond).        Right: Weight bearing axis crosses between tibial spines.       Left: Weight bearing axis crosses between tibial spines.      Leg length:  (Measured from the top of the femoral head to the center  of tibial plafond.  It is assumed joints are in similar degrees of  extension bilaterally.  Significant difference is defined when  discrepancy is greater than 1.5 cm).          No significant  leg length discrepancy.      Additional findings: Lungs are clear. Cardiac silhouette is normal.  Nonobstructive bowel gas pattern. Calcifications within the pelvis,  possibly calcified fibroid. Mild degenerative changes of bilateral hip  joints.      Impression    Impression:    1. Moderate degenerative changes of the lumbar spine most prominently  at L3-L4 and L4-L5 with grade 1/2 anterolisthesis.  2. No sagittal or coronal imbalance.  3. Calcifications noted within the pelvis. May be calcified fibroids  versus vascular in nature.    MICH RANDALL MD         SYSTEM ID:  [id number]     MR brain with/without contrast 5/13/2022:   IMPRESSION:     1. No evidence for acute infarct.   2. No abnormal intra-axial signal or enhancement.   3. Degenerative disc disease at the C4-C5 level of the cervical spine.     MR thoracic spine  with/without 4/19/2022:  IMPRESSION:   Previously noted mildly lobulated, extramedullary, intradural focus of T2 hypointensity at the level of T9 demonstrates associated enhancement, series 16, image 17. This finding is indeterminate, but could represent a nerve sheath tumor or meningioma. There is contact with the left lateral spinal cord without appreciable mass effect. Interval follow-up examination is recommended.    MR cervical spine without contrast 4/7/2022:  1. Moderate spinal canal stenosis at C4-5 and C5-6.  2. Moderate to severe bilateral neural foraminal stenosis at C4-5.  3. Additional degenerative changes as above.    MR lumbar spine without contrast for 3/29/2022  IMPRESSION: Compared with 01/10/2019,  1. Progression of degenerative changes at L3-4. There is now severe canal stenosis. Similar moderate to severe bilateral foraminal stenosis.  2. Remainder of additional degenerative changes are overall similar to prior. Severe canal stenosis L4-5. Moderate bilateral foraminal stenosis L5-S1.     EMG/NCS 5/18/2022:  Electrodiagnostic Conclusion:  This is a mildly abnormal study. Pertinent findings:  1. There is no electrodiagnostic evidence to indicate any acute or subacute lumbosacral radiculopathy affecting either lower extremity.  2. There is no electrodiagnostic evidence to indicate a large fiber polyneuropathy.  3. There is electrodiagnostic evidence to suggest a very mild chronic left L4 radiculopathy without suggestion of recent denervation.    PLAN:  Ms. Vizcaino is a 67-year-old woman with hereditary spastic paraplegia type VII, on baclofen; low back pain with lumbar L3-L4, L4-L5 spinal canal stenosis with L4-L5 spondylolisthesis presenting with incidentally discovered enhancing mass at T9 primarily on the left side.     We discussed extensively, that the mass might be a extruded disc or meningioma or nerve sheath tumor. The edges of the mass were not smooth favoring a disc. Given the  asymptomatic nature of the lesion, we will follow the patient in November with a repeat Thoracic MRI with and without contrast.     We also reviewed her lumbar spinal stenosis. Her back pain is tolerable, and she is functional with good exercise tolerance. We discussed the etiology, the natural course of progression, and the possible management options if her pain becomes intolerable. She expressed understanding of the symptoms to watch for. We will see her in the clinic in 3 months after her MRI. She was happy with the plan.    The patient was seen by Dr. Peterson, who agrees with the plan    Jay Montero MD  Neurosurgery Resident  Pager- [phone]      Sincerely,    Gail Peterson MD

## 2022-08-30 NOTE — NURSING NOTE
Chief Complaint   Patient presents with     New Thoracic Spine     Spinal chord tumor     Patrick Fernandez

## 2022-09-30 ENCOUNTER — TELEPHONE (OUTPATIENT)
Dept: NEUROSURGERY | Facility: CLINIC | Age: 67
End: 2022-09-30

## 2022-10-03 ENCOUNTER — TELEPHONE (OUTPATIENT)
Dept: NEUROSURGERY | Facility: CLINIC | Age: 67
End: 2022-10-03

## 2022-10-04 ENCOUNTER — TELEPHONE (OUTPATIENT)
Dept: NEUROSURGERY | Facility: CLINIC | Age: 67
End: 2022-10-04

## 2022-10-06 ENCOUNTER — TELEPHONE (OUTPATIENT)
Dept: NEUROSURGERY | Facility: CLINIC | Age: 67
End: 2022-10-06

## 2022-10-06 NOTE — TELEPHONE ENCOUNTER
[facility] Call Center    Phone Message    May a detailed message be left on voicemail: yes     Reason for Call: Other: Patient needs to have MRI done prior to appt, she needs the orders faxed to [facility] @ [phone]. Any questions please reach out to patient to discuss. Thank you     Action Taken: Message routed to:  [facility] ([facility] Neurosurgery     Travel Screening: Not Applicable

## 2022-10-11 NOTE — TELEPHONE ENCOUNTER
Writer confirmed order faxed to [facility] @ [phone].  Sent patient MyChart message to let her know order faxed.     Lenore Chatterjee LPN  Neurosurgery

## 2022-10-29 ENCOUNTER — HEALTH MAINTENANCE LETTER (OUTPATIENT)
Age: 67
End: 2022-10-29

## 2023-06-21 NOTE — NURSING NOTE
Chief Complaint   Patient presents with     Consult     Concerns of walking gate     Prachi Moreno CMA at 9:48 AM on 7/8/2022.    Exam room : 3.       Patient called back and writer relayed message.  Patient said she has really bad reception at work with her phone.  She said the Vicodin is making her very nauseas at work.    Patient said she has a work break at 2pm this afternoon and she will call the nurse back then.

## 2023-07-27 ENCOUNTER — MYC MEDICAL ADVICE (OUTPATIENT)
Dept: NEUROLOGY | Facility: CLINIC | Age: 68
End: 2023-07-27
Payer: COMMERCIAL

## 2023-08-01 NOTE — TELEPHONE ENCOUNTER
Pt wanted to make a return appointment to see Dr. Alvarado. Spoke to her on MOnday and arranged for appointment on Aug 18 at 10:30am. She wants to discuss if there is any new treatment for her disease.

## 2023-11-11 ENCOUNTER — HEALTH MAINTENANCE LETTER (OUTPATIENT)
Age: 68
End: 2023-11-11

## 2024-10-26 ENCOUNTER — HEALTH MAINTENANCE LETTER (OUTPATIENT)
Age: 69
End: 2024-10-26

## 2024-12-28 ENCOUNTER — HEALTH MAINTENANCE LETTER (OUTPATIENT)
Age: 69
End: 2024-12-28

## 2025-02-18 ENCOUNTER — TELEPHONE (OUTPATIENT)
Dept: NEUROLOGY | Facility: CLINIC | Age: 70
End: 2025-02-18
Payer: COMMERCIAL

## 2025-02-18 NOTE — TELEPHONE ENCOUNTER
TELEPHONE CALL    The patient had recently contacted the [facility] with a message that she wanted to discuss back pain. I have since attempted to reach the patient twice over phone but could not reach her. I have also sent her a [employer] message and encouraged her to use [employer] messaging as another form of communication in the two voicemails I left    Milla Baez MD  Movement Disorder Fellow

## 2025-03-23 ENCOUNTER — HEALTH MAINTENANCE LETTER (OUTPATIENT)
Age: 70
End: 2025-03-23